# Patient Record
Sex: MALE | Race: WHITE | NOT HISPANIC OR LATINO | Employment: UNEMPLOYED | URBAN - METROPOLITAN AREA
[De-identification: names, ages, dates, MRNs, and addresses within clinical notes are randomized per-mention and may not be internally consistent; named-entity substitution may affect disease eponyms.]

---

## 2022-07-14 LAB
EXTERNAL HIV CONFIRMATION: NORMAL
EXTERNAL HIV SCREEN: NORMAL
HCV AB SER-ACNC: NEGATIVE

## 2024-06-07 ENCOUNTER — TELEPHONE (OUTPATIENT)
Dept: ADMINISTRATIVE | Facility: OTHER | Age: 49
End: 2024-06-07

## 2024-06-07 NOTE — TELEPHONE ENCOUNTER
----- Message from Hansa MEJIA sent at 6/6/2024 10:03 AM EDT -----  06/06/24 10:03 AM    Hello, our patient has had CRC: Cologuard, Hepatitis C, and HIV completed/performed. Please assist in updating the patient chart by pulling the Care Everywhere (CE) document. The date of service is Cologuard 8-2-22, HIV and Hep C 7-14-22.     Thank you,  Hansa WILDE

## 2024-06-11 ENCOUNTER — OFFICE VISIT (OUTPATIENT)
Dept: FAMILY MEDICINE CLINIC | Facility: CLINIC | Age: 49
End: 2024-06-11
Payer: COMMERCIAL

## 2024-06-11 VITALS
HEART RATE: 83 BPM | TEMPERATURE: 97.7 F | HEIGHT: 68 IN | OXYGEN SATURATION: 95 % | BODY MASS INDEX: 39.1 KG/M2 | DIASTOLIC BLOOD PRESSURE: 84 MMHG | SYSTOLIC BLOOD PRESSURE: 112 MMHG | WEIGHT: 258 LBS

## 2024-06-11 DIAGNOSIS — I10 ESSENTIAL HYPERTENSION: ICD-10-CM

## 2024-06-11 DIAGNOSIS — Z12.5 SCREENING FOR PROSTATE CANCER: ICD-10-CM

## 2024-06-11 DIAGNOSIS — E53.8 VITAMIN B12 DEFICIENCY: ICD-10-CM

## 2024-06-11 DIAGNOSIS — J30.9 ALLERGIC RHINITIS, UNSPECIFIED SEASONALITY, UNSPECIFIED TRIGGER: ICD-10-CM

## 2024-06-11 DIAGNOSIS — Z00.00 ANNUAL PHYSICAL EXAM: Primary | ICD-10-CM

## 2024-06-11 DIAGNOSIS — Z13.0 SCREENING FOR DEFICIENCY ANEMIA: ICD-10-CM

## 2024-06-11 DIAGNOSIS — Z86.718 HISTORY OF DEEP VEIN THROMBOSIS (DVT) OF LOWER EXTREMITY: ICD-10-CM

## 2024-06-11 DIAGNOSIS — E78.2 MIXED HYPERLIPIDEMIA: ICD-10-CM

## 2024-06-11 PROCEDURE — 99213 OFFICE O/P EST LOW 20 MIN: CPT

## 2024-06-11 PROCEDURE — 99386 PREV VISIT NEW AGE 40-64: CPT

## 2024-06-11 RX ORDER — LANOLIN ALCOHOL/MO/W.PET/CERES
1000 CREAM (GRAM) TOPICAL DAILY
COMMUNITY
Start: 2014-06-08

## 2024-06-11 RX ORDER — CETIRIZINE HYDROCHLORIDE 10 MG/1
10 TABLET ORAL DAILY
COMMUNITY
Start: 2014-06-08

## 2024-06-11 RX ORDER — ACETAMINOPHEN 500 MG
500 TABLET ORAL EVERY 6 HOURS PRN
COMMUNITY

## 2024-06-11 RX ORDER — AMLODIPINE BESYLATE AND BENAZEPRIL HYDROCHLORIDE 10; 20 MG/1; MG/1
1 CAPSULE ORAL DAILY
COMMUNITY
Start: 2022-07-08 | End: 2024-06-11 | Stop reason: SDUPTHER

## 2024-06-11 RX ORDER — CALCIUM CARBONATE 300MG(750)
400 TABLET,CHEWABLE ORAL DAILY
COMMUNITY

## 2024-06-11 RX ORDER — AMLODIPINE BESYLATE AND BENAZEPRIL HYDROCHLORIDE 10; 20 MG/1; MG/1
1 CAPSULE ORAL DAILY
Qty: 90 CAPSULE | Refills: 3 | Status: SHIPPED | OUTPATIENT
Start: 2024-06-11

## 2024-06-11 NOTE — ASSESSMENT & PLAN NOTE
This was postop quite a few years ago.  Had been on warfarin for multiple years, and was eventually cleared to stop this.  Still takes aspirin daily, but was cleared for this as well.  Patient is to go to ER if symptoms present.

## 2024-06-11 NOTE — TELEPHONE ENCOUNTER
Hepatitis & HIV: Upon review of the In Basket request we were able to locate, review, and update the patient chart as requested for Hepatitis C  and HIV.      Cologuard: Upon review of the In Basket request we were able to note that no further action is required. The patient chart is up to date as a result of a previous request.      Any additional questions or concerns should be emailed to the Practice Liaisons via the appropriate education email address, please do not reply via In Basket.    Thank you  Soniya Miguel   [facility]

## 2024-06-11 NOTE — ASSESSMENT & PLAN NOTE
At goal today.  Continue Lotrel.  Encouraged to occasionally take at home blood pressures, and to contact office if persistently greater than 140/90.

## 2024-06-11 NOTE — ASSESSMENT & PLAN NOTE
Will continue to monitor this.  Currently diet controlled.  Educated on healthy diet and adequate activity.

## 2024-06-11 NOTE — PROGRESS NOTES
Adult Annual Physical  Name: Obi Whitaker      : 1975      MRN: [id number]  Encounter Provider: Panda Schwarz PA-C  Encounter Date: 2024   Encounter department: [facility]    Assessment & Plan   1. Annual physical exam  2. Essential hypertension  Assessment & Plan:  At goal today.  Continue Lotrel.  Encouraged to occasionally take at home blood pressures, and to contact office if persistently greater than 140/90.  Orders:  -     amLODIPine-benazepril (LOTREL) 10-20 MG per capsule; Take 1 capsule by mouth daily  -     Comprehensive metabolic panel; Future  3. Allergic rhinitis, unspecified seasonality, unspecified trigger  Assessment & Plan:  Stable on Zyrtec daily.  Orders:  -     Comprehensive metabolic panel; Future  4. Vitamin B12 deficiency  Assessment & Plan:  Continue supplement.  Will follow-up pending lab results.  Orders:  -     Comprehensive metabolic panel; Future  -     Vitamin B12; Future  5. Mixed hyperlipidemia  Assessment & Plan:  Will continue to monitor this.  Currently diet controlled.  Educated on healthy diet and adequate activity.  Orders:  -     Lipid panel; Future  -     Comprehensive metabolic panel; Future  6. History of deep vein thrombosis (DVT) of lower extremity  Assessment & Plan:  This was postop quite a few years ago.  Had been on warfarin for multiple years, and was eventually cleared to stop this.  Still takes aspirin daily, but was cleared for this as well.  Patient is to go to ER if symptoms present.  7. Screening for deficiency anemia  -     CBC and differential; Future  8. Screening for prostate cancer  -     PSA, total and free; Future    Immunizations and preventive care screenings were discussed with patient today. Appropriate education was printed on patient's after visit summary.    Discussed risks and benefits of prostate cancer screening. We discussed the controversial history of PSA screening for prostate cancer in the [place] as well as the risk of over detection and over treatment of prostate cancer by way of PSA screening.  The patient understands that PSA blood testing is an imperfect way to screen for prostate cancer and that elevated PSA levels in the blood may also be caused by infection, inflammation, prostatic trauma or manipulation, urological procedures, or by benign prostatic enlargement.    The role of the digital rectal examination in prostate cancer screening was also discussed and I discussed with him that there is large interobserver variability in the findings of digital rectal examination.    Counseling:  Alcohol/drug use: discussed moderation in alcohol intake, the recommendations for healthy alcohol use, and avoidance of illicit drug use.  Dental Health: discussed importance of regular tooth brushing, flossing, and dental visits.  Injury prevention: discussed safety/seat belts, safety helmets, smoke detectors, carbon dioxide detectors, and smoking near bedding or upholstery.  Sexual health: discussed sexually transmitted diseases, partner selection, use of condoms, avoidance of unintended pregnancy, and contraceptive alternatives.  Exercise: the importance of regular exercise/physical activity was discussed. Recommend exercise 3-5 times per week for at least 30 minutes.          History of Present Illness     Adult Annual Physical:  Patient presents for annual physical. Patient is a 49-year-old male presenting for new patient annual physical.  Patient has no concerns today..     Diet and Physical Activity:  - Diet/Nutrition: well balanced diet and limited junk food.  - Exercise: no formal exercise.    Depression Screening:  - PHQ-2 Score: 0    General Health:  - Sleep: sleeps well and 7-8 hours of sleep on average.  - Hearing: normal hearing bilateral ears.  - Vision: no vision problems and wears glasses.  - Dental: no dental visits for > 1 year.     Health:  - History of STDs: no.   - Urinary symptoms: none.      Review of Systems   Constitutional:  Negative for appetite change, chills, diaphoresis, fatigue and fever.   HENT:  Negative for congestion, ear discharge, ear pain, postnasal drip, rhinorrhea, sinus pressure, sinus pain, sneezing and sore throat.    Eyes:  Negative for pain, discharge, redness, itching and visual disturbance.   Respiratory:  Negative for apnea, cough, chest tightness, shortness of breath and wheezing.    Cardiovascular:  Negative for chest pain, palpitations and leg swelling.   Gastrointestinal:  Negative for abdominal pain, blood in stool, constipation, diarrhea, nausea and vomiting.   Endocrine: Negative for cold intolerance, heat intolerance, polydipsia and polyuria.   Genitourinary:  Negative for dysuria, flank pain, frequency, hematuria and urgency.   Musculoskeletal:  Negative for arthralgias, back pain, myalgias, neck pain and neck stiffness.   Skin:  Negative for color change and rash.   Allergic/Immunologic: Negative.    Neurological:  Negative for dizziness, tremors, seizures, syncope, facial asymmetry, speech difficulty, weakness, light-headedness, numbness and headaches.   Hematological:  Negative for adenopathy. Does not bruise/bleed easily.   Psychiatric/Behavioral:  Negative for agitation, confusion, decreased concentration, dysphoric mood, hallucinations, self-injury, sleep disturbance and suicidal ideas. The patient is not nervous/anxious and is not hyperactive.    All other systems reviewed and are negative.    Medical History Reviewed by provider this encounter:       Current Outpatient Medications on File Prior to Visit   Medication Sig Dispense Refill    acetaminophen (TYLENOL) 500 mg tablet Take 500 mg by mouth every 6 (six) hours as needed for moderate pain or mild pain      ascorbic acid (VITAMIN C) 1000 MG tablet Take 1,000 mg by mouth daily      Aspirin 81 MG CAPS Take 1 capsule by mouth in the morning      cetirizine (ZyrTEC) 10 mg tablet Take 10 mg by mouth daily    "   Magnesium 400 MG TABS Take 400 mg by mouth daily      multivitamin-minerals (CENTRUM) tablet Take 1 tablet by mouth daily      vitamin B-12 (VITAMIN B-12) 1,000 mcg tablet Take 1,000 mcg by mouth daily      [DISCONTINUED] amLODIPine-benazepril (LOTREL) 10-20 MG per capsule Take 1 capsule by mouth daily       No current facility-administered medications on file prior to visit.      Social History     Tobacco Use    Smoking status: Never    Smokeless tobacco: Current     Types: Chew   Vaping Use    Vaping status: Never Used   Substance and Sexual Activity    Alcohol use: Yes     Alcohol/week: 5.0 standard drinks of alcohol     Types: 5 Cans of beer per week    Drug use: Never    Sexual activity: Yes     Partners: Female     Birth control/protection: Male Sterilization       Objective     /84 (BP Location: Left arm, Patient Position: Sitting)   Pulse 83   Temp 97.7 °F (36.5 °C) (Tympanic)   Ht 5' 8\" (1.727 m)   Wt 117 kg (258 lb)   SpO2 95%   BMI 39.23 kg/m²     Physical Exam  Vitals and nursing note reviewed.   Constitutional:       General: He is not in acute distress.     Appearance: Normal appearance. He is well-developed. He is obese. He is not ill-appearing, toxic-appearing or diaphoretic.   HENT:      Head: Normocephalic and atraumatic.      Right Ear: Tympanic membrane normal.      Left Ear: Tympanic membrane normal.      Nose: Nose normal.      Mouth/Throat:      Mouth: Mucous membranes are moist.      Pharynx: Oropharynx is clear.   Eyes:      Extraocular Movements: Extraocular movements intact.      Conjunctiva/sclera: Conjunctivae normal.      Pupils: Pupils are equal, round, and reactive to light.   Cardiovascular:      Rate and Rhythm: Normal rate and regular rhythm.      Pulses: Normal pulses.      Heart sounds: Normal heart sounds. No murmur heard.  Pulmonary:      Effort: Pulmonary effort is normal. No respiratory distress.      Breath sounds: Normal breath sounds. No wheezing.   Chest: "      Chest wall: No tenderness.   Abdominal:      General: Bowel sounds are normal.      Palpations: Abdomen is soft. There is no mass.      Tenderness: There is no abdominal tenderness.   Musculoskeletal:         General: No swelling or tenderness. Normal range of motion.      Cervical back: Normal range of motion and neck supple. No tenderness.      Right lower leg: No edema.      Left lower leg: No edema.   Lymphadenopathy:      Cervical: No cervical adenopathy.   Skin:     General: Skin is warm and dry.      Capillary Refill: Capillary refill takes less than 2 seconds.      Findings: No erythema, lesion or rash.   Neurological:      General: No focal deficit present.      Mental Status: He is alert and oriented to person, place, and time. Mental status is at baseline.      Cranial Nerves: No cranial nerve deficit.      Motor: No weakness.      Coordination: Coordination normal.      Gait: Gait normal.   Psychiatric:         Mood and Affect: Mood normal.         Behavior: Behavior normal.         Thought Content: Thought content normal.         Judgment: Judgment normal.       Administrative Statements

## 2024-06-18 DIAGNOSIS — E53.8 VITAMIN B12 DEFICIENCY: Primary | ICD-10-CM

## 2024-07-16 ENCOUNTER — TELEPHONE (OUTPATIENT)
Dept: FAMILY MEDICINE CLINIC | Facility: CLINIC | Age: 49
End: 2024-07-16

## 2024-10-30 ENCOUNTER — TELEPHONE (OUTPATIENT)
Dept: FAMILY MEDICINE CLINIC | Facility: CLINIC | Age: 49
End: 2024-10-30

## 2024-10-30 NOTE — TELEPHONE ENCOUNTER
Left vm for pt to reschedule his 12/19 appt to 12/18 at the same time.  If pt returns call, please transfer directly to the office so we can reschedule them.

## 2024-12-18 ENCOUNTER — TELEPHONE (OUTPATIENT)
Age: 49
End: 2024-12-18

## 2024-12-18 ENCOUNTER — OFFICE VISIT (OUTPATIENT)
Dept: FAMILY MEDICINE CLINIC | Facility: CLINIC | Age: 49
End: 2024-12-18
Payer: COMMERCIAL

## 2024-12-18 VITALS
BODY MASS INDEX: 41.21 KG/M2 | SYSTOLIC BLOOD PRESSURE: 126 MMHG | HEART RATE: 100 BPM | OXYGEN SATURATION: 95 % | WEIGHT: 271 LBS | TEMPERATURE: 98.9 F | DIASTOLIC BLOOD PRESSURE: 78 MMHG

## 2024-12-18 DIAGNOSIS — Z86.718 HISTORY OF DEEP VEIN THROMBOSIS (DVT) OF LOWER EXTREMITY: ICD-10-CM

## 2024-12-18 DIAGNOSIS — E66.01 MORBID OBESITY (HCC): ICD-10-CM

## 2024-12-18 DIAGNOSIS — E53.8 VITAMIN B12 DEFICIENCY: ICD-10-CM

## 2024-12-18 DIAGNOSIS — I10 ESSENTIAL HYPERTENSION: Primary | ICD-10-CM

## 2024-12-18 DIAGNOSIS — E78.2 MIXED HYPERLIPIDEMIA: ICD-10-CM

## 2024-12-18 DIAGNOSIS — G89.29 CHRONIC BILATERAL LOW BACK PAIN WITH BILATERAL SCIATICA: ICD-10-CM

## 2024-12-18 DIAGNOSIS — M54.42 CHRONIC BILATERAL LOW BACK PAIN WITH BILATERAL SCIATICA: ICD-10-CM

## 2024-12-18 DIAGNOSIS — M54.41 CHRONIC BILATERAL LOW BACK PAIN WITH BILATERAL SCIATICA: ICD-10-CM

## 2024-12-18 DIAGNOSIS — Z23 ENCOUNTER FOR IMMUNIZATION: ICD-10-CM

## 2024-12-18 PROCEDURE — 90471 IMMUNIZATION ADMIN: CPT

## 2024-12-18 PROCEDURE — 90673 RIV3 VACCINE NO PRESERV IM: CPT

## 2024-12-18 PROCEDURE — 99214 OFFICE O/P EST MOD 30 MIN: CPT

## 2024-12-18 RX ORDER — AMLODIPINE AND BENAZEPRIL HYDROCHLORIDE 10; 20 MG/1; MG/1
1 CAPSULE ORAL DAILY
Qty: 90 CAPSULE | Refills: 3 | Status: SHIPPED | OUTPATIENT
Start: 2024-12-18

## 2024-12-18 NOTE — ASSESSMENT & PLAN NOTE
Prior Authorization Clinical Questions for Weight Management Pharmacotherapy    1. Does the patient have a contrainidcation to medication prescribed for weight management?: No  2. Does the patient have a diagnosis of obesity, confirmed by a BMI greater than or equal to 30 kg/m^2?: Yes  3. Does the patient have a BMI of greater than or equal to 27 kg/m^2 with at least one weight-related comorbidity/risk factor/complication (e.g. diabetes, dyslipidemia, coronary artery disease)?: Yes  4. Weight-related co-morbidities/risk factors: dyslipidemia, hypertension  5. Has the patient been on a weight loss regimen of low-calorie diet, increased physical activity, and lifestyle modifications for a minimum of 6 months?: Yes  6. Has the patient completed a comprehensive weight loss program (ie, Weight Watchers, Noom, Bariatrics, other amelia on phone)? If so, what?: No  7. Does the patient have a history of type 2 diabetes?: No  8. Has the member tried and failed other weight loss medication within the past 12 months?: No  9. Will the member use requested medication in combination with another GLP agonist or weight loss drug?: No  10. Is the medication a controlled substance?: No     Baseline weight (in pounds): 271 lbs     Declines personal or family history of medullary thyroid cancer, multiple endocrine neoplasia, or pancreatitis.  Will have patient call insurance to see if they cover Wegovy or Zepbound, and prescribe based on that.  Educated on side effects of medication, and is to contact office or go to ER if these present.    Orders:    CBC and differential; Future

## 2024-12-18 NOTE — ASSESSMENT & PLAN NOTE
Contact office or go to ER with signs/symptoms of this.  Orders:    CBC and differential; Future    Comprehensive metabolic panel; Future

## 2024-12-18 NOTE — ASSESSMENT & PLAN NOTE
Will get lumbar spine x-ray.  Continue follow-up with chiropractor.  Will discuss results of x-ray, and will continue management from there.  Orders:    Comprehensive metabolic panel; Future    XR spine lumbar minimum 4 views non injury; Future

## 2024-12-18 NOTE — PROGRESS NOTES
Name: Obi Whitaker      : 1975      MRN: [id number]  Encounter Provider: Panda Schwarz PA-C  Encounter Date: 2024   Encounter department: [facility]  :  Assessment & Plan  Essential hypertension  At goal today.  Continue Lotrel.  Encouraged to take at home blood pressures and contact office if persistently elevated.  Orders:    amLODIPine-benazepril (LOTREL) 10-20 MG per capsule; Take 1 capsule by mouth daily    Comprehensive metabolic panel; Future    History of deep vein thrombosis (DVT) of lower extremity  Contact office or go to ER with signs/symptoms of this.  Orders:    CBC and differential; Future    Comprehensive metabolic panel; Future    Mixed hyperlipidemia  Will continue to monitor.  Educated on healthy diet and activity.  Orders:    Comprehensive metabolic panel; Future    Lipid panel; Future    Vitamin B12 deficiency  Will continue to monitor.  Orders:    Vitamin B12; Future    Comprehensive metabolic panel; Future    Chronic bilateral low back pain with bilateral sciatica  Will get lumbar spine x-ray.  Continue follow-up with chiropractor.  Will discuss results of x-ray, and will continue management from there.  Orders:    Comprehensive metabolic panel; Future    XR spine lumbar minimum 4 views non injury; Future    Morbid obesity (HCC)  Prior Authorization Clinical Questions for Weight Management Pharmacotherapy    1. Does the patient have a contrainidcation to medication prescribed for weight management?: No  2. Does the patient have a diagnosis of obesity, confirmed by a BMI greater than or equal to 30 kg/m^2?: Yes  3. Does the patient have a BMI of greater than or equal to 27 kg/m^2 with at least one weight-related comorbidity/risk factor/complication (e.g. diabetes, dyslipidemia, coronary artery disease)?: Yes  4. Weight-related co-morbidities/risk factors: dyslipidemia, hypertension  5. Has the patient been on a weight loss regimen of low-calorie diet,  "increased physical activity, and lifestyle modifications for a minimum of 6 months?: Yes  6. Has the patient completed a comprehensive weight loss program (ie, Weight Watchers, Noom, Bariatrics, other amelia on phone)? If so, what?: No  7. Does the patient have a history of type 2 diabetes?: No  8. Has the member tried and failed other weight loss medication within the past 12 months?: No  9. Will the member use requested medication in combination with another GLP agonist or weight loss drug?: No  10. Is the medication a controlled substance?: No     Baseline weight (in pounds): 271 lbs     Declines personal or family history of medullary thyroid cancer, multiple endocrine neoplasia, or pancreatitis.  Will have patient call insurance to see if they cover Wegovy or Zepbound, and prescribe based on that.  Educated on side effects of medication, and is to contact office or go to ER if these present.    Orders:    CBC and differential; Future    BMI 40.0-44.9, adult (HCC)  Declines personal or family history of medullary thyroid cancer, multiple endocrine neoplasia, or pancreatitis.  Will have patient call insurance to see if they cover Wegovy or Zepbound, and prescribe based on that.  Educated on side effects of medication, and is to contact office or go to ER if these present.  Orders:    CBC and differential; Future    Encounter for immunization    Orders:    influenza vaccine, recombinant, PF, 0.5 mL IM (Flublok)           History of Present Illness     Patient is a 49-year-old male presenting for follow-up.  Patient has been having chronic low back pain with bilateral sciatica.  Feels like he is having cramping in the right thigh and the left foot.  Describes a numbness/tingling that is shooting down his legs from his back.  It is more so than numbness that is bothering him and the pain.  Has been gaining weight.  Chiropractor said that his spine is \"56% out of place\".  Would like to try a weight loss medication like " Wegovy or Zepbound.  Denies personal or family history of medullary thyroid cancer, multiple endocrine neoplasia, or pancreatitis.  No other questions or concerns today.      Review of Systems   Constitutional:  Negative for appetite change, chills, diaphoresis, fatigue and fever.   HENT:  Negative for congestion, ear discharge, ear pain, postnasal drip, rhinorrhea, sinus pressure, sinus pain, sneezing and sore throat.    Eyes:  Negative for pain, discharge, redness, itching and visual disturbance.   Respiratory:  Negative for apnea, cough, chest tightness, shortness of breath and wheezing.    Cardiovascular:  Negative for chest pain, palpitations and leg swelling.   Gastrointestinal:  Negative for abdominal pain, blood in stool, constipation, diarrhea, nausea and vomiting.   Endocrine: Negative for cold intolerance, heat intolerance, polydipsia and polyuria.   Genitourinary:  Negative for dysuria, flank pain, frequency, hematuria and urgency.   Musculoskeletal:  Positive for back pain. Negative for arthralgias, myalgias, neck pain and neck stiffness.   Skin:  Negative for color change and rash.   Allergic/Immunologic: Negative.    Neurological:  Positive for numbness. Negative for dizziness, tremors, seizures, syncope, facial asymmetry, speech difficulty, weakness, light-headedness and headaches.   Hematological:  Negative for adenopathy. Does not bruise/bleed easily.   Psychiatric/Behavioral:  Negative for agitation, confusion, decreased concentration, dysphoric mood, hallucinations, self-injury, sleep disturbance and suicidal ideas. The patient is not nervous/anxious and is not hyperactive.    All other systems reviewed and are negative.      Objective   /78 (BP Location: Left arm, Patient Position: Sitting)   Pulse 100   Temp 98.9 °F (37.2 °C) (Tympanic)   Wt 123 kg (271 lb)   SpO2 95%   BMI 41.21 kg/m²      Physical Exam  Vitals and nursing note reviewed.   Constitutional:       General: He is not in  acute distress.     Appearance: Normal appearance. He is well-developed. He is obese. He is not ill-appearing, toxic-appearing or diaphoretic.   HENT:      Head: Normocephalic and atraumatic.      Right Ear: Tympanic membrane normal.      Left Ear: Tympanic membrane normal.      Nose: Nose normal.      Mouth/Throat:      Mouth: Mucous membranes are moist.      Pharynx: Oropharynx is clear.   Eyes:      Extraocular Movements: Extraocular movements intact.      Conjunctiva/sclera: Conjunctivae normal.      Pupils: Pupils are equal, round, and reactive to light.   Neck:      Vascular: No carotid bruit.   Cardiovascular:      Rate and Rhythm: Normal rate and regular rhythm.      Pulses: Normal pulses.      Heart sounds: Normal heart sounds. No murmur heard.  Pulmonary:      Effort: Pulmonary effort is normal. No respiratory distress.      Breath sounds: Normal breath sounds. No wheezing.   Chest:      Chest wall: No tenderness.   Abdominal:      General: Bowel sounds are normal.      Palpations: Abdomen is soft. There is no mass.      Tenderness: There is no abdominal tenderness.   Musculoskeletal:         General: No swelling or tenderness. Normal range of motion.      Cervical back: Normal range of motion and neck supple. No tenderness.      Right lower leg: No edema.      Left lower leg: No edema.   Lymphadenopathy:      Cervical: No cervical adenopathy.   Skin:     General: Skin is warm and dry.      Capillary Refill: Capillary refill takes less than 2 seconds.      Findings: No erythema, lesion or rash.   Neurological:      General: No focal deficit present.      Mental Status: He is alert and oriented to person, place, and time. Mental status is at baseline.      Cranial Nerves: No cranial nerve deficit.      Sensory: No sensory deficit.      Motor: No weakness.      Coordination: Coordination normal.      Gait: Gait normal.      Deep Tendon Reflexes: Reflexes normal.   Psychiatric:         Mood and Affect: Mood  normal.         Behavior: Behavior normal.         Thought Content: Thought content normal.         Judgment: Judgment normal.

## 2024-12-18 NOTE — ASSESSMENT & PLAN NOTE
Declines personal or family history of medullary thyroid cancer, multiple endocrine neoplasia, or pancreatitis.  Will have patient call insurance to see if they cover Wegovy or Zepbound, and prescribe based on that.  Educated on side effects of medication, and is to contact office or go to ER if these present.  Orders:    CBC and differential; Future

## 2024-12-18 NOTE — ASSESSMENT & PLAN NOTE
Will continue to monitor.  Orders:    Vitamin B12; Future    Comprehensive metabolic panel; Future

## 2024-12-18 NOTE — TELEPHONE ENCOUNTER
Pt's wife called advising pt reached out to his insurance and neither Wegovy nor Zepbound are covered and they did not have alternate medications that would be covered by the insurance. Please advise if there is anything else pt can take.

## 2024-12-18 NOTE — ASSESSMENT & PLAN NOTE
At goal today.  Continue Lotrel.  Encouraged to take at home blood pressures and contact office if persistently elevated.  Orders:    amLODIPine-benazepril (LOTREL) 10-20 MG per capsule; Take 1 capsule by mouth daily    Comprehensive metabolic panel; Future

## 2024-12-18 NOTE — ASSESSMENT & PLAN NOTE
Will continue to monitor.  Educated on healthy diet and activity.  Orders:    Comprehensive metabolic panel; Future    Lipid panel; Future

## 2024-12-20 DIAGNOSIS — E66.01 MORBID OBESITY (HCC): Primary | ICD-10-CM

## 2024-12-23 DIAGNOSIS — E66.01 MORBID OBESITY (HCC): ICD-10-CM

## 2024-12-23 NOTE — TELEPHONE ENCOUNTER
Patient requested a change in pharmacy as the medication is out of stock at the mail order pharmacy

## 2025-06-09 ENCOUNTER — RESULTS FOLLOW-UP (OUTPATIENT)
Dept: FAMILY MEDICINE CLINIC | Facility: CLINIC | Age: 50
End: 2025-06-09

## 2025-06-18 ENCOUNTER — OFFICE VISIT (OUTPATIENT)
Dept: FAMILY MEDICINE CLINIC | Facility: CLINIC | Age: 50
End: 2025-06-18
Payer: COMMERCIAL

## 2025-06-18 VITALS
TEMPERATURE: 97.2 F | DIASTOLIC BLOOD PRESSURE: 71 MMHG | SYSTOLIC BLOOD PRESSURE: 111 MMHG | OXYGEN SATURATION: 92 % | HEART RATE: 86 BPM | WEIGHT: 258.4 LBS | BODY MASS INDEX: 39.16 KG/M2 | HEIGHT: 68 IN

## 2025-06-18 DIAGNOSIS — Z12.5 SCREENING FOR PROSTATE CANCER: ICD-10-CM

## 2025-06-18 DIAGNOSIS — R35.1 NOCTURIA: ICD-10-CM

## 2025-06-18 DIAGNOSIS — E53.8 VITAMIN B12 DEFICIENCY: ICD-10-CM

## 2025-06-18 DIAGNOSIS — E66.01 MORBID OBESITY (HCC): ICD-10-CM

## 2025-06-18 DIAGNOSIS — Z00.00 ANNUAL PHYSICAL EXAM: Primary | ICD-10-CM

## 2025-06-18 DIAGNOSIS — E78.2 MIXED HYPERLIPIDEMIA: ICD-10-CM

## 2025-06-18 DIAGNOSIS — I10 ESSENTIAL HYPERTENSION: ICD-10-CM

## 2025-06-18 PROBLEM — Z86.718 HISTORY OF DEEP VEIN THROMBOSIS (DVT) OF LOWER EXTREMITY: Status: RESOLVED | Noted: 2024-06-11 | Resolved: 2025-06-18

## 2025-06-18 PROCEDURE — 99396 PREV VISIT EST AGE 40-64: CPT

## 2025-06-18 RX ORDER — AMLODIPINE AND BENAZEPRIL HYDROCHLORIDE 10; 20 MG/1; MG/1
1 CAPSULE ORAL DAILY
Qty: 90 CAPSULE | Refills: 3 | Status: SHIPPED | OUTPATIENT
Start: 2025-06-18 | End: 2025-06-18

## 2025-06-18 RX ORDER — AMLODIPINE AND VALSARTAN 10; 160 MG/1; MG/1
1 TABLET ORAL DAILY
Qty: 30 TABLET | Refills: 1 | Status: SHIPPED | OUTPATIENT
Start: 2025-06-18

## 2025-06-18 NOTE — ASSESSMENT & PLAN NOTE
ACE inhibitor cough is present.  Will switch amlodipine to valsartan.  Educated patient to check blood pressure daily for 2 weeks and contact office with progression.  Goal blood pressure less than 130/80.  Will increase valsartan portion of medication if still above goal.  Orders:    amLODIPine-valsartan (EXFORGE)  MG per tablet; Take 1 tablet by mouth daily

## 2025-06-18 NOTE — PROGRESS NOTES
Adult Annual Physical  Name: Obi Whitaker      : 1975      MRN: [id number]  Encounter Provider: Panda Schwarz PA-C  Encounter Date: 2025   Encounter department: [facility]    :  Assessment & Plan  Annual physical exam         Essential hypertension  ACE inhibitor cough is present.  Will switch amlodipine to valsartan.  Educated patient to check blood pressure daily for 2 weeks and contact office with progression.  Goal blood pressure less than 130/80.  Will increase valsartan portion of medication if still above goal.  Orders:    amLODIPine-valsartan (EXFORGE)  MG per tablet; Take 1 tablet by mouth daily    Morbid obesity (HCC)  Educated on healthy diet and activity.    Orders:    CBC and differential; Future    Comprehensive metabolic panel; Future    Mixed hyperlipidemia  We will continue to monitor.  Educated on healthy diet and activity.  Orders:    Lipid panel; Future    Vitamin B12 deficiency  We will continue to monitor.  Orders:    Vitamin B12; Future    Screening for prostate cancer    Orders:    PSA, total and free; Future    Nocturia    Orders:    PSA, total and free; Future        Preventive Screenings:  - Diabetes Screening: screening up-to-date  - Cholesterol Screening: screening not indicated and has hyperlipidemia   - Hepatitis C screening: screening up-to-date   - HIV screening: screening up-to-date   - Colon cancer screening: screening up-to-date   - Lung cancer screening: screening not indicated   - Prostate cancer screening: risks/benefits discussed and orders placed     Immunizations:  - Immunizations due: Prevnar 20 and Zoster (Shingrix)  - Risks/benefits immunizations discussed      Counseling/Anticipatory Guidance:  - Alcohol: discussed moderation in alcohol intake and recommendations for healthy alcohol use.   - Drug use: discussed harms of illicit drug use and how it can negatively impact mental/physical health.   - Tobacco use: discussed  harms of tobacco use and management options for quitting.   - Dental health: discussed importance of regular tooth brushing, flossing, and dental visits.   - Sexual health: discussed sexually transmitted diseases, partner selection, use of condoms, avoidance of unintended pregnancy, and contraceptive alternatives.   - Diet: discussed recommendations for a healthy/well-balanced diet.   - Exercise: the importance of regular exercise/physical activity was discussed. Recommend exercise 3-5 times per week for at least 30 minutes.   - Injury prevention: discussed safety/seat belts, safety helmets, smoke detectors, carbon monoxide detectors, and smoking near bedding or upholstery.          History of Present Illness     Adult Annual Physical:  Patient presents for annual physical.     Diet and Physical Activity:  - Diet/Nutrition: no special diet.  - Exercise: no formal exercise.    Depression Screening:  - PHQ-2 Score: 0    General Health:  - Sleep: 7-8 hours of sleep on average.  - Hearing: normal hearing bilateral ears.  - Vision: wears glasses.  - Dental: no dental visits for > 1 year. Recommend 2x yearly prophylactic appts    /GYN Health:  - Follows with GYN: no.   - History of STDs: no     Health:  - History of STDs: no.   - Urinary symptoms: none.     Advanced Care Planning:  - Has an advanced directive?: no    - Has a durable medical POA?: no      Review of Systems   Constitutional:  Negative for appetite change, chills, diaphoresis, fatigue and fever.   HENT:  Negative for congestion, ear discharge, ear pain, postnasal drip, rhinorrhea, sinus pressure, sinus pain, sneezing and sore throat.    Eyes:  Negative for pain, discharge, redness, itching and visual disturbance.   Respiratory:  Negative for apnea, cough, chest tightness, shortness of breath and wheezing.    Cardiovascular:  Negative for chest pain, palpitations and leg swelling.   Gastrointestinal:  Negative for abdominal pain, blood in stool,  "constipation, diarrhea, nausea and vomiting.   Endocrine: Negative for cold intolerance, heat intolerance, polydipsia and polyuria.   Genitourinary:  Negative for dysuria, flank pain, frequency, hematuria and urgency.   Musculoskeletal:  Negative for arthralgias, back pain, myalgias, neck pain and neck stiffness.   Skin:  Negative for color change and rash.   Allergic/Immunologic: Negative.    Neurological:  Negative for dizziness, tremors, seizures, syncope, facial asymmetry, speech difficulty, weakness, light-headedness, numbness and headaches.   Hematological:  Negative for adenopathy. Does not bruise/bleed easily.   Psychiatric/Behavioral:  Negative for agitation, confusion, decreased concentration, dysphoric mood, hallucinations, self-injury, sleep disturbance and suicidal ideas. The patient is not nervous/anxious and is not hyperactive.    All other systems reviewed and are negative.    Medical History Reviewed by provider this encounter:     .  Medications Ordered Prior to Encounter[1]   Social History[2]    Objective   /71 (BP Location: Left arm, Patient Position: Sitting)   Pulse 86   Temp (!) 97.2 °F (36.2 °C) (Tympanic)   Ht 5' 8\" (1.727 m)   Wt 117 kg (258 lb 6.4 oz)   SpO2 92%   BMI 39.29 kg/m²     Physical Exam  Vitals and nursing note reviewed.   Constitutional:       General: He is not in acute distress.     Appearance: Normal appearance. He is well-developed. He is obese. He is not ill-appearing, toxic-appearing or diaphoretic.   HENT:      Head: Normocephalic and atraumatic.      Right Ear: Tympanic membrane normal.      Left Ear: Tympanic membrane normal.      Nose: Nose normal.      Mouth/Throat:      Mouth: Mucous membranes are moist.      Pharynx: Oropharynx is clear.     Eyes:      Extraocular Movements: Extraocular movements intact.      Conjunctiva/sclera: Conjunctivae normal.      Pupils: Pupils are equal, round, and reactive to light.     Neck:      Vascular: No carotid bruit. "     Cardiovascular:      Rate and Rhythm: Normal rate and regular rhythm.      Pulses: Normal pulses.      Heart sounds: Normal heart sounds. No murmur heard.  Pulmonary:      Effort: Pulmonary effort is normal. No respiratory distress.      Breath sounds: Normal breath sounds. No wheezing.   Chest:      Chest wall: No tenderness.   Abdominal:      General: Bowel sounds are normal.      Palpations: Abdomen is soft. There is no mass.      Tenderness: There is no abdominal tenderness.     Musculoskeletal:         General: No swelling or tenderness. Normal range of motion.      Cervical back: Normal range of motion and neck supple. No tenderness.      Right lower leg: No edema.      Left lower leg: No edema.   Lymphadenopathy:      Cervical: No cervical adenopathy.     Skin:     General: Skin is warm and dry.      Capillary Refill: Capillary refill takes less than 2 seconds.      Findings: No erythema, lesion or rash.     Neurological:      General: No focal deficit present.      Mental Status: He is alert and oriented to person, place, and time. Mental status is at baseline.      Cranial Nerves: No cranial nerve deficit.      Motor: No weakness.      Coordination: Coordination normal.      Gait: Gait normal.     Psychiatric:         Mood and Affect: Mood normal.         Behavior: Behavior normal.         Thought Content: Thought content normal.         Judgment: Judgment normal.              [1]   Current Outpatient Medications on File Prior to Visit   Medication Sig Dispense Refill    acetaminophen (TYLENOL) 500 mg tablet Take 500 mg by mouth every 6 (six) hours as needed for moderate pain or mild pain      ascorbic acid (VITAMIN C) 1000 MG tablet Take 1,000 mg by mouth in the morning.      Aspirin 81 MG CAPS Take 1 capsule by mouth in the morning      cetirizine (ZyrTEC) 10 mg tablet Take 10 mg by mouth in the morning.      Magnesium 400 MG TABS Take 400 mg by mouth in the morning.      multivitamin-minerals  (CENTRUM) tablet Take 1 tablet by mouth in the morning.      [DISCONTINUED] amLODIPine-benazepril (LOTREL) 10-20 MG per capsule Take 1 capsule by mouth daily 90 capsule 3    [DISCONTINUED] Semaglutide-Weight Management (WEGOVY) 0.25 MG/0.5ML Inject 0.5 mL (0.25 mg total) under the skin once a week 2 mL 0    [DISCONTINUED] vitamin B-12 (VITAMIN B-12) 1,000 mcg tablet Take 1,000 mcg by mouth daily       No current facility-administered medications on file prior to visit.   [2]   Social History  Tobacco Use    Smoking status: Never    Smokeless tobacco: Current     Types: Chew   Vaping Use    Vaping status: Never Used   Substance and Sexual Activity    Alcohol use: Yes     Alcohol/week: 5.0 standard drinks of alcohol     Types: 5 Cans of beer per week    Drug use: Never    Sexual activity: Yes     Partners: Female     Birth control/protection: Male Sterilization

## 2025-06-18 NOTE — ASSESSMENT & PLAN NOTE
We will continue to monitor.  Educated on healthy diet and activity.  Orders:    Lipid panel; Future

## 2025-07-08 ENCOUNTER — PATIENT MESSAGE (OUTPATIENT)
Dept: FAMILY MEDICINE CLINIC | Facility: CLINIC | Age: 50
End: 2025-07-08

## 2025-07-08 DIAGNOSIS — I10 ESSENTIAL HYPERTENSION: ICD-10-CM

## 2025-07-10 RX ORDER — AMLODIPINE AND VALSARTAN 10; 160 MG/1; MG/1
1 TABLET ORAL DAILY
Qty: 90 TABLET | Refills: 3 | Status: SHIPPED | OUTPATIENT
Start: 2025-07-10